# Patient Record
(demographics unavailable — no encounter records)

---

## 2025-03-05 NOTE — HISTORY OF PRESENT ILLNESS
[FreeTextEntry1] :  STEPHANIE MITCHELL is an 89-year-old female presenting for follow-up. Patient has been treated by her PCP for recurrent lower urinary tract infections. She reports feeling as if she empties her bladder poorly. Patient previously treated with Nitrofurantoin and Bactrim many times with recurrent symptoms. Her PVR today was 28 cc.

## 2025-03-05 NOTE — ADDENDUM
[FreeTextEntry1] : I, Alanna Hair assisted in documentation on 03/05/2025 acting as a scribe for Dr. Ruperto Cook. All medical record entries made by my scribe were at my, Dr. Ruperto Cook, direction and personally dictated by me. I have reviewed the chart and agree that the record accurately reflects my personal performance of the history, physical exam, assessment and plan. I have also personally directed, reviewed, and agreed with the chart.

## 2025-03-05 NOTE — END OF VISIT
[FreeTextEntry3] : Recommendations:  Recurrent UTI.  Will not prescribe further antibiotics at this time given likelihood of resistance.   Urine sent for culute, analysis, and cytology.   Atonic Bladder:  Start Bethanechol 25mg 1 tablet PO BID to attempt to increase bladder tone.   Urodynamic study and cystoscopy scheduled for 1 week given prior urethral syndrome.   May require urethral dilatation.